# Patient Record
Sex: MALE | Race: WHITE | NOT HISPANIC OR LATINO | Employment: STUDENT | URBAN - METROPOLITAN AREA
[De-identification: names, ages, dates, MRNs, and addresses within clinical notes are randomized per-mention and may not be internally consistent; named-entity substitution may affect disease eponyms.]

---
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
## 2025-01-04 DIAGNOSIS — I10 PRIMARY HYPERTENSION: ICD-10-CM

## 2025-01-05 RX ORDER — AMLODIPINE BESYLATE 5 MG/1
5 TABLET ORAL DAILY
Qty: 60 TABLET | Refills: 0 | Status: SHIPPED | OUTPATIENT
Start: 2025-01-05

## 2025-02-28 ENCOUNTER — OFFICE VISIT (OUTPATIENT)
Dept: FAMILY MEDICINE CLINIC | Facility: CLINIC | Age: 20
End: 2025-02-28
Payer: COMMERCIAL

## 2025-02-28 VITALS
HEIGHT: 71 IN | DIASTOLIC BLOOD PRESSURE: 80 MMHG | HEART RATE: 84 BPM | WEIGHT: 315 LBS | TEMPERATURE: 97.8 F | BODY MASS INDEX: 44.1 KG/M2 | OXYGEN SATURATION: 97 % | SYSTOLIC BLOOD PRESSURE: 140 MMHG

## 2025-02-28 DIAGNOSIS — I10 PRIMARY HYPERTENSION: ICD-10-CM

## 2025-02-28 DIAGNOSIS — F41.1 GAD (GENERALIZED ANXIETY DISORDER): Primary | ICD-10-CM

## 2025-02-28 PROBLEM — R03.0 ELEVATED BLOOD PRESSURE READING WITHOUT DIAGNOSIS OF HYPERTENSION: Status: RESOLVED | Noted: 2022-09-30 | Resolved: 2025-02-28

## 2025-02-28 PROCEDURE — 99214 OFFICE O/P EST MOD 30 MIN: CPT | Performed by: FAMILY MEDICINE

## 2025-02-28 RX ORDER — BUSPIRONE HYDROCHLORIDE 10 MG/1
10 TABLET ORAL 2 TIMES DAILY
Qty: 180 TABLET | Refills: 0 | Status: SHIPPED | OUTPATIENT
Start: 2025-02-28

## 2025-02-28 NOTE — PROGRESS NOTES
"Name: Josh Young      : 2005      MRN: [id number]  Encounter Provider: Steve Acosta MD  Encounter Date: 2025   Encounter department: [facility]  :  Assessment & Plan  RENÉE (generalized anxiety disorder)  Improving but still not fully controlled.  Increase BuSpar from 5 mg twice daily to 10 mg twice daily.  Orders:    busPIRone (BUSPAR) 10 mg tablet; Take 1 tablet (10 mg total) by mouth 2 (two) times a day    Primary hypertension  Improving but still borderline.  Continue amlodipine 5 mg daily.  Follow-up in 6 to 8 weeks for blood pressure recheck.  Dietary lifestyle modification.              History of Present Illness   Patient presents office today for follow-up.  Has history of hypertension and anxiety.  Last visit we started BuSpar.  He says he feels better and his anxiety definitely got better on BuSpar but still feels like he needs to increase the medication.  He had no side effects.  Blood pressure is borderline today.  He admits compliance with his blood pressure treatment.  On amlodipine.  No side effects.  Does not measure blood pressures at home.  Denies any symptoms of elevated blood pressure.      Review of Systems   All other systems reviewed and are negative.      Objective   /80 (BP Location: Left arm, Patient Position: Sitting, Cuff Size: Standard)   Pulse 84   Temp 97.8 °F (36.6 °C) (Tympanic)   Ht 5' 11\" (1.803 m)   Wt (!) 158 kg (349 lb)   SpO2 97%   BMI 48.68 kg/m²      Physical Exam  Vitals and nursing note reviewed.   Constitutional:       General: He is not in acute distress.     Appearance: Normal appearance. He is well-developed. He is not ill-appearing, toxic-appearing or diaphoretic.   HENT:      Head: Normocephalic and atraumatic.   Eyes:      General:         Right eye: No discharge.         Left eye: No discharge.      Extraocular Movements: Extraocular movements intact.      Conjunctiva/sclera: Conjunctivae normal. "   Cardiovascular:      Rate and Rhythm: Normal rate.      Pulses:           Dorsalis pedis pulses are 2+ on the right side and 2+ on the left side.        Posterior tibial pulses are 2+ on the right side and 2+ on the left side.   Pulmonary:      Effort: Pulmonary effort is normal.   Musculoskeletal:      Cervical back: Normal range of motion and neck supple.   Feet:      Right foot:      Skin integrity: No ulcer, skin breakdown, erythema, warmth, callus or dry skin.      Left foot:      Skin integrity: No ulcer, skin breakdown, erythema, warmth, callus or dry skin.   Skin:     General: Skin is dry.      Capillary Refill: Capillary refill takes less than 2 seconds.   Neurological:      Mental Status: He is alert and oriented to person, place, and time.   Psychiatric:         Mood and Affect: Mood normal.         Behavior: Behavior normal.         Thought Content: Thought content normal.         Judgment: Judgment normal.

## 2025-02-28 NOTE — ASSESSMENT & PLAN NOTE
Improving but still not fully controlled.  Increase BuSpar from 5 mg twice daily to 10 mg twice daily.  Orders:    busPIRone (BUSPAR) 10 mg tablet; Take 1 tablet (10 mg total) by mouth 2 (two) times a day

## 2025-02-28 NOTE — ASSESSMENT & PLAN NOTE
Improving but still borderline.  Continue amlodipine 5 mg daily.  Follow-up in 6 to 8 weeks for blood pressure recheck.  Dietary lifestyle modification.

## 2025-04-25 ENCOUNTER — OFFICE VISIT (OUTPATIENT)
Dept: FAMILY MEDICINE CLINIC | Facility: CLINIC | Age: 20
End: 2025-04-25
Payer: COMMERCIAL

## 2025-04-25 VITALS
HEIGHT: 71 IN | DIASTOLIC BLOOD PRESSURE: 92 MMHG | HEART RATE: 92 BPM | WEIGHT: 315 LBS | OXYGEN SATURATION: 98 % | TEMPERATURE: 97.8 F | SYSTOLIC BLOOD PRESSURE: 160 MMHG | RESPIRATION RATE: 18 BRPM | BODY MASS INDEX: 44.1 KG/M2

## 2025-04-25 DIAGNOSIS — I10 PRIMARY HYPERTENSION: ICD-10-CM

## 2025-04-25 DIAGNOSIS — F41.1 GAD (GENERALIZED ANXIETY DISORDER): Primary | ICD-10-CM

## 2025-04-25 PROCEDURE — 99214 OFFICE O/P EST MOD 30 MIN: CPT | Performed by: FAMILY MEDICINE

## 2025-04-25 RX ORDER — AMLODIPINE BESYLATE 10 MG/1
10 TABLET ORAL DAILY
Qty: 90 TABLET | Refills: 0 | Status: SHIPPED | OUTPATIENT
Start: 2025-04-25

## 2025-04-25 NOTE — ASSESSMENT & PLAN NOTE
Stable.  Continue BuSpar 10 mg daily.  Will follow-up with treatment plan adjustments if needed at next visit.  We focused on optimizing blood pressure control today.

## 2025-04-25 NOTE — PROGRESS NOTES
"Name: Josh Young      : 2005      MRN: [id number]  Encounter Provider: Steve Acosta MD  Encounter Date: 2025   Encounter department: [facility]  :  Assessment & Plan  Primary hypertension  Blood pressure 160/92 today.  Blood pressure still uncontrolled.  Increase amlodipine from 5 to 10 mg daily.  I recommended he start monitoring blood pressures at home.  Mom is a nurse and they have a cuff at home.  He will start checking blood pressures 3-5 times a week.  We will follow-up in 4 to 6 weeks.  He will return with blood pressure log.    Orders:    amLODIPine (NORVASC) 10 mg tablet; Take 1 tablet (10 mg total) by mouth daily    RENÉE (generalized anxiety disorder)  Stable.  Continue BuSpar 10 mg daily.  Will follow-up with treatment plan adjustments if needed at next visit.  We focused on optimizing blood pressure control today.           Assessment & Plan           History of Present Illness   Presents to the office today for follow-up on hypertension chronic conditions.  Last visit we started amlodipine.  Does not measure blood pressures at home.  Has not had any symptoms or side effects from amlodipine.  He is on BuSpar for anxiety.  No issues with anxiety today.  Blood pressure is high at 160/92 today.  He denies any symptoms.      Review of Systems   All other systems reviewed and are negative.      Objective   /92 (BP Location: Left arm, Patient Position: Sitting, Cuff Size: Large)   Pulse 92   Temp 97.8 °F (36.6 °C) (Tympanic)   Resp 18   Ht 5' 11\" (1.803 m)   Wt (!) 162 kg (357 lb)   SpO2 98%   BMI 49.79 kg/m²      Physical Exam  Vitals and nursing note reviewed.   Constitutional:       General: He is not in acute distress.     Appearance: Normal appearance. He is well-developed. He is not ill-appearing, toxic-appearing or diaphoretic.   HENT:      Head: Normocephalic and atraumatic.   Eyes:      General:         Right eye: No discharge.         Left " eye: No discharge.      Extraocular Movements: Extraocular movements intact.      Conjunctiva/sclera: Conjunctivae normal.   Cardiovascular:      Rate and Rhythm: Normal rate.      Pulses:           Dorsalis pedis pulses are 2+ on the right side and 2+ on the left side.        Posterior tibial pulses are 2+ on the right side and 2+ on the left side.   Pulmonary:      Effort: Pulmonary effort is normal.   Musculoskeletal:      Cervical back: Normal range of motion and neck supple.   Feet:      Right foot:      Skin integrity: No ulcer, skin breakdown, erythema, warmth, callus or dry skin.      Left foot:      Skin integrity: No ulcer, skin breakdown, erythema, warmth, callus or dry skin.   Skin:     General: Skin is dry.      Capillary Refill: Capillary refill takes less than 2 seconds.   Neurological:      Mental Status: He is alert and oriented to person, place, and time.   Psychiatric:         Mood and Affect: Mood normal.         Behavior: Behavior normal.         Thought Content: Thought content normal.         Judgment: Judgment normal.

## 2025-04-25 NOTE — ASSESSMENT & PLAN NOTE
Blood pressure 160/92 today.  Blood pressure still uncontrolled.  Increase amlodipine from 5 to 10 mg daily.  I recommended he start monitoring blood pressures at home.  Mom is a nurse and they have a cuff at home.  He will start checking blood pressures 3-5 times a week.  We will follow-up in 4 to 6 weeks.  He will return with blood pressure log.    Orders:    amLODIPine (NORVASC) 10 mg tablet; Take 1 tablet (10 mg total) by mouth daily

## 2025-07-02 ENCOUNTER — OFFICE VISIT (OUTPATIENT)
Dept: FAMILY MEDICINE CLINIC | Facility: CLINIC | Age: 20
End: 2025-07-02
Payer: COMMERCIAL

## 2025-07-02 VITALS
HEART RATE: 101 BPM | BODY MASS INDEX: 47.74 KG/M2 | TEMPERATURE: 97.1 F | WEIGHT: 315 LBS | OXYGEN SATURATION: 99 % | HEIGHT: 68 IN | SYSTOLIC BLOOD PRESSURE: 130 MMHG | DIASTOLIC BLOOD PRESSURE: 80 MMHG

## 2025-07-02 DIAGNOSIS — F41.1 GAD (GENERALIZED ANXIETY DISORDER): ICD-10-CM

## 2025-07-02 DIAGNOSIS — I10 PRIMARY HYPERTENSION: Primary | ICD-10-CM

## 2025-07-02 DIAGNOSIS — Z13.220 SCREENING FOR HYPERLIPIDEMIA: ICD-10-CM

## 2025-07-02 PROCEDURE — 99214 OFFICE O/P EST MOD 30 MIN: CPT | Performed by: FAMILY MEDICINE

## 2025-07-02 NOTE — PROGRESS NOTES
"Name: Josh Young      : 2005      MRN: [id number]  Encounter Provider: Steve Acosta MD  Encounter Date: 2025   Encounter department: [facility]    :  Assessment & Plan  Primary hypertension    Orders:    Comprehensive metabolic panel; Future    RENÉE (generalized anxiety disorder)         Screening for hyperlipidemia    Orders:    Lipid panel; Future      Assessment & Plan  1. Hypertension.  - Blood pressure remains slightly elevated today, but home readings are consistently within the normal range (120s-130s/70s-80s).  - Current dosage of amlodipine 10 mg will be maintained.  - A comprehensive panel and lipid panel will be ordered to assess sugar levels, electrolytes, kidney function, and liver function.  - These tests should be completed a few days prior to the next visit.    2. Anxiety.  - Anxiety is well-managed with BuSpar.  - Current regimen of BuSpar will be continued.  - No changes in medication are necessary at this time.  - Patient reports no concerns or issues with current treatment.    Follow-up  The patient will follow up in October for a physical examination and blood pressure check.           History of Present Illness     History of Present Illness  The patient presents for a 6-week follow-up of hypertension and anxiety.    He has been monitoring his blood pressure at home, although inconsistently due to his work schedule. The readings have been within the normal range, typically in the 120s to 130s over 70s to 80s. He is currently on amlodipine 10 mg for hypertension management.    His anxiety is well-managed with BuSpar, and he reports no issues with this medication.     Review of Systems   All other systems reviewed and are negative.    Objective   /80   Pulse 101   Temp (!) 97.1 °F (36.2 °C) (Tympanic)   Ht 5' 8\" (1.727 m)   Wt (!) 158 kg (348 lb)   SpO2 99%   BMI 52.91 kg/m²     Physical Exam    Physical Exam  Vitals and nursing note " reviewed.   Constitutional:       General: He is not in acute distress.     Appearance: Normal appearance. He is well-developed. He is not ill-appearing, toxic-appearing or diaphoretic.   HENT:      Head: Normocephalic and atraumatic.     Eyes:      General:         Right eye: No discharge.         Left eye: No discharge.      Extraocular Movements: Extraocular movements intact.      Conjunctiva/sclera: Conjunctivae normal.       Cardiovascular:      Rate and Rhythm: Normal rate.      Pulses:           Dorsalis pedis pulses are 2+ on the right side and 2+ on the left side.        Posterior tibial pulses are 2+ on the right side and 2+ on the left side.   Pulmonary:      Effort: Pulmonary effort is normal.     Musculoskeletal:      Cervical back: Normal range of motion and neck supple.   Feet:      Right foot:      Skin integrity: No ulcer, skin breakdown, erythema, warmth, callus or dry skin.      Left foot:      Skin integrity: No ulcer, skin breakdown, erythema, warmth, callus or dry skin.     Skin:     General: Skin is dry.      Capillary Refill: Capillary refill takes less than 2 seconds.     Neurological:      Mental Status: He is alert and oriented to person, place, and time.     Psychiatric:         Mood and Affect: Mood normal.         Behavior: Behavior normal.         Thought Content: Thought content normal.         Judgment: Judgment normal.